# Patient Record
Sex: FEMALE | Race: WHITE | NOT HISPANIC OR LATINO | Employment: UNEMPLOYED | URBAN - METROPOLITAN AREA
[De-identification: names, ages, dates, MRNs, and addresses within clinical notes are randomized per-mention and may not be internally consistent; named-entity substitution may affect disease eponyms.]

---

## 2021-08-02 PROBLEM — Z11.59 SCREENING FOR VIRAL DISEASE: Status: ACTIVE | Noted: 2021-08-02

## 2021-08-02 PROBLEM — G43.019 INTRACTABLE MIGRAINE WITHOUT AURA AND WITHOUT STATUS MIGRAINOSUS: Status: ACTIVE | Noted: 2021-08-02

## 2021-08-02 PROBLEM — G47.00 INSOMNIA: Status: ACTIVE | Noted: 2021-08-02

## 2021-08-02 PROBLEM — E55.9 VITAMIN D DEFICIENCY: Status: ACTIVE | Noted: 2021-08-02

## 2021-08-02 PROBLEM — R53.83 FATIGUE: Status: ACTIVE | Noted: 2021-08-02

## 2023-09-20 ENCOUNTER — TELEPHONE (OUTPATIENT)
Dept: SURGERY | Facility: CLINIC | Age: 38
End: 2023-09-20
Payer: COMMERCIAL

## 2023-09-20 NOTE — TELEPHONE ENCOUNTER
Pt with h/o hemorrhoids, c/o increasing pain. Assisted pt making appt with Dr Page.  ----- Message from Tonya Wright RN sent at 9/20/2023 12:30 PM CDT -----  Contact: Pt    ----- Message -----  From: Ronel Mena  Sent: 9/20/2023  11:48 AM CDT  To: [facility]    Type:  Needs Medical Advice    Who Called: Pt  Symptoms (please be specific): Hemorrhoids   How long has patient had these symptoms:  2 weeks    Would the patient rather a call back or a response via [facility]? call  Best Call Back Number: [phone]  Additional Information: Pt would like to schedule with a colorectal surgeon. Please call pt back to advise.

## 2023-09-21 ENCOUNTER — OFFICE VISIT (OUTPATIENT)
Dept: SURGERY | Facility: CLINIC | Age: 38
End: 2023-09-21
Payer: COMMERCIAL

## 2023-09-21 VITALS
RESPIRATION RATE: 16 BRPM | BODY MASS INDEX: 19.95 KG/M2 | HEIGHT: 60 IN | TEMPERATURE: 98 F | HEART RATE: 74 BPM | SYSTOLIC BLOOD PRESSURE: 120 MMHG | OXYGEN SATURATION: 98 % | WEIGHT: 101.63 LBS | DIASTOLIC BLOOD PRESSURE: 78 MMHG

## 2023-09-21 DIAGNOSIS — K61.1 PERIRECTAL ABSCESS: Primary | ICD-10-CM

## 2023-09-21 PROCEDURE — 3074F PR MOST RECENT SYSTOLIC BLOOD PRESSURE < 130 MM HG: ICD-10-PCS | Mod: CPTII,S$GLB,, | Performed by: COLON & RECTAL SURGERY

## 2023-09-21 PROCEDURE — 99999 PR PBB SHADOW E&M-EST. PATIENT-LVL III: CPT | Mod: PBBFAC,,, | Performed by: COLON & RECTAL SURGERY

## 2023-09-21 PROCEDURE — 1159F MED LIST DOCD IN RCRD: CPT | Mod: CPTII,S$GLB,, | Performed by: COLON & RECTAL SURGERY

## 2023-09-21 PROCEDURE — 1159F PR MEDICATION LIST DOCUMENTED IN MEDICAL RECORD: ICD-10-PCS | Mod: CPTII,S$GLB,, | Performed by: COLON & RECTAL SURGERY

## 2023-09-21 PROCEDURE — 99999 PR PBB SHADOW E&M-EST. PATIENT-LVL III: ICD-10-PCS | Mod: PBBFAC,,, | Performed by: COLON & RECTAL SURGERY

## 2023-09-21 PROCEDURE — 3008F BODY MASS INDEX DOCD: CPT | Mod: CPTII,S$GLB,, | Performed by: COLON & RECTAL SURGERY

## 2023-09-21 PROCEDURE — 3074F SYST BP LT 130 MM HG: CPT | Mod: CPTII,S$GLB,, | Performed by: COLON & RECTAL SURGERY

## 2023-09-21 PROCEDURE — 3078F PR MOST RECENT DIASTOLIC BLOOD PRESSURE < 80 MM HG: ICD-10-PCS | Mod: CPTII,S$GLB,, | Performed by: COLON & RECTAL SURGERY

## 2023-09-21 PROCEDURE — 3078F DIAST BP <80 MM HG: CPT | Mod: CPTII,S$GLB,, | Performed by: COLON & RECTAL SURGERY

## 2023-09-21 PROCEDURE — 99204 PR OFFICE/OUTPT VISIT, NEW, LEVL IV, 45-59 MIN: ICD-10-PCS | Mod: S$GLB,,, | Performed by: COLON & RECTAL SURGERY

## 2023-09-21 PROCEDURE — 99204 OFFICE O/P NEW MOD 45 MIN: CPT | Mod: S$GLB,,, | Performed by: COLON & RECTAL SURGERY

## 2023-09-21 PROCEDURE — 3008F PR BODY MASS INDEX (BMI) DOCUMENTED: ICD-10-PCS | Mod: CPTII,S$GLB,, | Performed by: COLON & RECTAL SURGERY

## 2023-09-21 RX ORDER — METRONIDAZOLE 500 MG/1
500 TABLET ORAL
COMMUNITY
End: 2023-09-21 | Stop reason: SDUPTHER

## 2023-09-21 RX ORDER — METRONIDAZOLE 500 MG/1
500 TABLET ORAL EVERY 8 HOURS
Qty: 30 TABLET | Refills: 0 | Status: SHIPPED | OUTPATIENT
Start: 2023-09-21 | End: 2023-10-18 | Stop reason: ALTCHOICE

## 2023-09-21 RX ORDER — TRAZODONE HYDROCHLORIDE 50 MG/1
25 TABLET ORAL NIGHTLY
COMMUNITY
Start: 2023-09-06

## 2023-09-21 NOTE — PROGRESS NOTES
"[facility] Office/In-Patient History and Physical    Referring MD:   No referring provider defined for this encounter.    SUBJECTIVE:     History of Present Illness:  Patient is a 38 y.o. female with a Family Hx of IBD in 3 cousins (2 Crohn's & 1 Colitis) who has had a Hemorrhoidectomy 5-6 years ago for protruding hemorrhoids. She then underwent office excision of large skin tags in 2023 that apparently required a "V" flap repair with postoperative relative stenosis that seemed to resolve itself. She presents now with 2 weeks of painful swelling near the coccyx without fever or chills. This site burst this morning and drained some pus/blood with partial relief of pain. She is able to sit fairly comfortably now. She presents for further evaluation.    Last Colonoscopy: N/A      Past Medical History:   Diagnosis Date    Anxiety     Common migraine without aura     Moderate recurrent major depression     Pleurodynia        Past Surgical History:   Procedure Laterality Date    AUGMENTATION OF BREAST  2002     SECTION  2003       Review of patient's allergies indicates:  No Known Allergies    Current Outpatient Medications on File Prior to Visit   Medication Sig Dispense Refill    busPIRone (BUSPAR) 10 MG tablet SMARTSI By Mouth Twice Daily PRN      levocetirizine (XYZAL) 5 MG tablet       rimegepant (NURTEC) 75 mg odt Take 75 mg by mouth once as needed for Migraine. Place ODT tablet on the tongue; alternatively the ODT tablet may be placed under the tongue      traZODone (DESYREL) 50 MG tablet Take 50 mg by mouth every evening.       No current facility-administered medications on file prior to visit.       Family History   Problem Relation Age of Onset    Hypertension Mother     Hyperlipidemia Mother     Hypertension Father     Coronary artery disease Father     Hyperlipidemia Father        Social History     Tobacco Use    Smoking status: Never    Smokeless tobacco: Never   Substance Use Topics    " Alcohol use: Yes    Drug use: Not Currently        Review of Systems:  Constitutional: Negative for fever, appetite change and unexpected weight change.  HENT: Negative for sore throat and trouble swallowing.  Eyes: Negative for visual disturbance.  Respiratory: Negative for chest tightness, shortness of breath and wheezing.  Cardiovascular: Negative for chest pain.  Gastrointestinal:  as per HPI  Genitourinary: Negative for dysuria, frequency and hematuria.  Musculoskeletal: Negative for myalgias, joint swelling and arthralgias.  Skin: Negative for color change and rash.   Neurological: Negative for syncope, weakness and headaches.  Psychiatric/Behavioral: Negative for confusion.      OBJECTIVE:     Vital Signs (Most Recent)  /78 (BP Location: Right arm, Patient Position: Sitting, BP Method: Medium (Manual))   Pulse 74   Temp 98 °F (36.7 °C) (Temporal)   Resp 16   Ht 5' (1.524 m)   Wt 46.1 kg (101 lb 10.1 oz)   SpO2 98%   BMI 19.85 kg/m²     Physical Exam:  GENERAL:  Comfortable, in no acute distress.      SKIN: Non-jaundiced  EXTREMITIES:  No edema.     NEURO: CN II-XII intact; motor/sensory non-focal.                            HEENT EXAM:  Nonicteric.  No adenopathy.  Oropharynx is clear.               NECK:  Supple.                                                               LUNGS:  Clear.                                                               CARDIAC:  Regular rate and rhythm.  S1, S2.  No murmur.                      ABDOMEN:  Soft, positive bowel sounds, nontender.  No hepatosplenomegaly or masses.  No rebound or guarding.                                                Anorectal:   Findings:   Inspection: draining external sinus- LP, thick pus noted from small pinpoint opening, ? Fissure- MLP just inside anus (or post-op changes from anorectal operations?)  ALISON:  painful      ASSESSMENT:     Encounter Diagnosis   Name Primary?    Perirectal abscess Yes          PLAN:    Reassured that  this abscess has spontaneously drained, therefore no need for surgical intervention at this point. Flagyl Rx prescribed and allow further resolution of the infection. There is potential for an underlying anal fistula tract that will become apparent over time, if the sinus opening does not heal completely. Plan colonoscopy at some point in order to R/O underlying IBD or other pathology.  RTO in 2 weeks for follow-up.

## 2023-10-09 ENCOUNTER — OFFICE VISIT (OUTPATIENT)
Dept: SURGERY | Facility: CLINIC | Age: 38
End: 2023-10-09
Payer: COMMERCIAL

## 2023-10-09 VITALS
RESPIRATION RATE: 16 BRPM | HEART RATE: 73 BPM | BODY MASS INDEX: 20.04 KG/M2 | DIASTOLIC BLOOD PRESSURE: 62 MMHG | OXYGEN SATURATION: 99 % | TEMPERATURE: 97 F | WEIGHT: 102.06 LBS | HEIGHT: 60 IN | SYSTOLIC BLOOD PRESSURE: 108 MMHG

## 2023-10-09 DIAGNOSIS — K61.1 PERIRECTAL ABSCESS: Primary | ICD-10-CM

## 2023-10-09 PROCEDURE — 99213 OFFICE O/P EST LOW 20 MIN: CPT | Mod: S$GLB,,, | Performed by: COLON & RECTAL SURGERY

## 2023-10-09 PROCEDURE — 3078F DIAST BP <80 MM HG: CPT | Mod: CPTII,S$GLB,, | Performed by: COLON & RECTAL SURGERY

## 2023-10-09 PROCEDURE — 1159F MED LIST DOCD IN RCRD: CPT | Mod: CPTII,S$GLB,, | Performed by: COLON & RECTAL SURGERY

## 2023-10-09 PROCEDURE — 3008F PR BODY MASS INDEX (BMI) DOCUMENTED: ICD-10-PCS | Mod: CPTII,S$GLB,, | Performed by: COLON & RECTAL SURGERY

## 2023-10-09 PROCEDURE — 99213 PR OFFICE/OUTPT VISIT, EST, LEVL III, 20-29 MIN: ICD-10-PCS | Mod: S$GLB,,, | Performed by: COLON & RECTAL SURGERY

## 2023-10-09 PROCEDURE — 3078F PR MOST RECENT DIASTOLIC BLOOD PRESSURE < 80 MM HG: ICD-10-PCS | Mod: CPTII,S$GLB,, | Performed by: COLON & RECTAL SURGERY

## 2023-10-09 PROCEDURE — 3074F PR MOST RECENT SYSTOLIC BLOOD PRESSURE < 130 MM HG: ICD-10-PCS | Mod: CPTII,S$GLB,, | Performed by: COLON & RECTAL SURGERY

## 2023-10-09 PROCEDURE — 99999 PR PBB SHADOW E&M-EST. PATIENT-LVL III: ICD-10-PCS | Mod: PBBFAC,,, | Performed by: COLON & RECTAL SURGERY

## 2023-10-09 PROCEDURE — 3074F SYST BP LT 130 MM HG: CPT | Mod: CPTII,S$GLB,, | Performed by: COLON & RECTAL SURGERY

## 2023-10-09 PROCEDURE — 99999 PR PBB SHADOW E&M-EST. PATIENT-LVL III: CPT | Mod: PBBFAC,,, | Performed by: COLON & RECTAL SURGERY

## 2023-10-09 PROCEDURE — 3008F BODY MASS INDEX DOCD: CPT | Mod: CPTII,S$GLB,, | Performed by: COLON & RECTAL SURGERY

## 2023-10-09 PROCEDURE — 1159F PR MEDICATION LIST DOCUMENTED IN MEDICAL RECORD: ICD-10-PCS | Mod: CPTII,S$GLB,, | Performed by: COLON & RECTAL SURGERY

## 2023-10-09 NOTE — PROGRESS NOTES
"CRS Office/In-Patient History and Physical    Referring MD:   No referring provider defined for this encounter.    SUBJECTIVE:     History of Present Illness:  Patient is a 38 y.o. female with a Family Hx of IBD in 3 cousins (2 Crohn's & 1 Colitis) who had a Hemorrhoidectomy 5-6 years ago for protruding hemorrhoids. She then underwent office excision of large skin tags in 2023 that apparently required a "V" flap repair with postoperative relative stenosis that seemed to resolve itself. She presents now with 2 weeks of painful swelling near the coccyx without fever or chills, which burst earlier in the morning and drained some pus/blood with partial relief of pain. She was given a course of Flagyl and feels better without fever or chills and the pain has improved. She is able to sit comfortably now. She presents for further evaluation.     Last Colonoscopy: N/A    I have reviewed the following images and labs:  No results found for this or any previous visit.     Lab Results   Component Value Date    WBC 3.48 (L) 2021    HGB 13.6 2021    HCT 40.6 2021     2021    CHOL 200 (H) 2021    TRIG 58 2021    HDL 66 2021    ALT 15 2021    AST 25 2021     2021    K 4.5 2021     2021    CREATININE 0.80 2021    BUN 18 2021    CO2 29 2021    TSH 1.580 2021        Past Medical History:   Diagnosis Date    Anxiety     Common migraine without aura     Moderate recurrent major depression     Pleurodynia        Past Surgical History:   Procedure Laterality Date    AUGMENTATION OF BREAST  2002     SECTION  2003       Review of patient's allergies indicates:   Allergen Reactions    Hydrocodone-acetaminophen Itching       Current Outpatient Medications on File Prior to Visit   Medication Sig Dispense Refill    busPIRone (BUSPAR) 10 MG tablet SMARTSI By Mouth Twice Daily PRN      levocetirizine (XYZAL) 5 " MG tablet       metroNIDAZOLE (FLAGYL) 500 MG tablet Take 1 tablet (500 mg total) by mouth every 8 (eight) hours. Take 1 pill three times a day for 10 days. 30 tablet 0    rimegepant (NURTEC) 75 mg odt Take 75 mg by mouth once as needed for Migraine. Place ODT tablet on the tongue; alternatively the ODT tablet may be placed under the tongue      traZODone (DESYREL) 50 MG tablet Take 50 mg by mouth every evening.       No current facility-administered medications on file prior to visit.       Family History   Problem Relation Age of Onset    Hypertension Mother     Hyperlipidemia Mother     Hypertension Father     Coronary artery disease Father     Hyperlipidemia Father        Social History     Tobacco Use    Smoking status: Never    Smokeless tobacco: Never   Substance Use Topics    Alcohol use: Yes    Drug use: Not Currently        Review of Systems:  Constitutional: Negative for fever, appetite change and unexpected weight change.  HENT: Negative for sore throat and trouble swallowing.  Eyes: Negative for visual disturbance.  Respiratory: Negative for chest tightness, shortness of breath and wheezing.  Cardiovascular: Negative for chest pain.  Gastrointestinal:  as per HPI  Genitourinary: Negative for dysuria, frequency and hematuria.  Musculoskeletal: Negative for myalgias, joint swelling and arthralgias.  Skin: Negative for color change and rash.   Neurological: Negative for syncope, weakness and headaches.  Psychiatric/Behavioral: Negative for confusion.      OBJECTIVE:     Vital Signs (Most Recent)  /62 (BP Location: Left arm, Patient Position: Sitting, BP Method: Medium (Manual))   Pulse 73   Temp 97.3 °F (36.3 °C) (Temporal)   Resp 16   Ht 5' (1.524 m)   Wt 46.3 kg (102 lb 1.2 oz)   LMP 09/08/2023 (Within Days)   SpO2 99%   BMI 19.93 kg/m²            Physical Exam:  GENERAL:  Comfortable, in no acute distress.      SKIN: Non-jaundiced  EXTREMITIES:  No edema.     NEURO: CN II-XII intact;  motor/sensory non-focal.                            HEENT EXAM:  Nonicteric.  No adenopathy.  Oropharynx is clear.               NECK:  Supple.                                                               LUNGS:  Clear.                                                               CARDIAC:  Regular rate and rhythm.  S1, S2.  No murmur.                      ABDOMEN:  Soft, positive bowel sounds, nontender.  No hepatosplenomegaly or masses.  No rebound or guarding.                                                Anorectal:   Inspection: + old draining sinus - LP, looks closed today without fluctuance or induration  ALISON:  deferred      ASSESSMENT:     Encounter Diagnosis   Name Primary?    Perirectal abscess Yes          PLAN:    Recommend colonoscopy prior to I & D with possible fistulotomy for the chronic anal fistula. We will make the appropriate arrangements when it is convenient for her.

## 2023-11-13 ENCOUNTER — OFFICE VISIT (OUTPATIENT)
Dept: SURGERY | Facility: CLINIC | Age: 38
End: 2023-11-13
Payer: COMMERCIAL

## 2023-11-13 VITALS
SYSTOLIC BLOOD PRESSURE: 105 MMHG | DIASTOLIC BLOOD PRESSURE: 61 MMHG | WEIGHT: 103.19 LBS | OXYGEN SATURATION: 97 % | TEMPERATURE: 97 F | HEART RATE: 96 BPM | BODY MASS INDEX: 20.26 KG/M2 | RESPIRATION RATE: 16 BRPM | HEIGHT: 60 IN

## 2023-11-13 DIAGNOSIS — K60.3 TRANSSPHINCTERIC ANAL FISTULA: Primary | ICD-10-CM

## 2023-11-13 PROCEDURE — 3074F PR MOST RECENT SYSTOLIC BLOOD PRESSURE < 130 MM HG: ICD-10-PCS | Mod: CPTII,S$GLB,, | Performed by: COLON & RECTAL SURGERY

## 2023-11-13 PROCEDURE — 1159F PR MEDICATION LIST DOCUMENTED IN MEDICAL RECORD: ICD-10-PCS | Mod: CPTII,S$GLB,, | Performed by: COLON & RECTAL SURGERY

## 2023-11-13 PROCEDURE — 3078F PR MOST RECENT DIASTOLIC BLOOD PRESSURE < 80 MM HG: ICD-10-PCS | Mod: CPTII,S$GLB,, | Performed by: COLON & RECTAL SURGERY

## 2023-11-13 PROCEDURE — 99024 PR POST-OP FOLLOW-UP VISIT: ICD-10-PCS | Mod: S$GLB,,, | Performed by: COLON & RECTAL SURGERY

## 2023-11-13 PROCEDURE — 3074F SYST BP LT 130 MM HG: CPT | Mod: CPTII,S$GLB,, | Performed by: COLON & RECTAL SURGERY

## 2023-11-13 PROCEDURE — 1159F MED LIST DOCD IN RCRD: CPT | Mod: CPTII,S$GLB,, | Performed by: COLON & RECTAL SURGERY

## 2023-11-13 PROCEDURE — 3078F DIAST BP <80 MM HG: CPT | Mod: CPTII,S$GLB,, | Performed by: COLON & RECTAL SURGERY

## 2023-11-13 PROCEDURE — 99024 POSTOP FOLLOW-UP VISIT: CPT | Mod: S$GLB,,, | Performed by: COLON & RECTAL SURGERY

## 2023-11-13 PROCEDURE — 99999 PR PBB SHADOW E&M-EST. PATIENT-LVL IV: ICD-10-PCS | Mod: PBBFAC,,, | Performed by: COLON & RECTAL SURGERY

## 2023-11-13 PROCEDURE — 99999 PR PBB SHADOW E&M-EST. PATIENT-LVL IV: CPT | Mod: PBBFAC,,, | Performed by: COLON & RECTAL SURGERY

## 2023-11-13 NOTE — PROGRESS NOTES
HPI:  Randi Cuevas is a 38 y.o. female  3  weeks  s/p fistulectomy with complete resolution of pain and drainage. She is having regular stools without difficulty. The final pathology report showed benign tissue only. She presents for follow-up.       Past Medical History:   Diagnosis Date    Anxiety     Asthma     Common migraine without aura     Migraines     Moderate recurrent major depression     Pleurodynia         Past Surgical History:   Procedure Laterality Date    AUGMENTATION OF BREAST  2002     SECTION  2003    COLONOSCOPY N/A 10/19/2023    Procedure: COLONOSCOPY;  Surgeon: Demetrio Page MD;  Location: [facility];  Service: Colon and Rectal;  Laterality: N/A;  pt will go home    FISTULECTOMY N/A 10/20/2023    Procedure: FISTULECTOMY;  Surgeon: Demetrio Page MD;  Location: [facility];  Service: Colon and Rectal;  Laterality: N/A;    HEMORRHOID SURGERY      twice)( second sx in 2022, first sx 2017    INCISION AND DRAINAGE OF ABSCESS N/A 10/20/2023    Procedure: INCISION AND DRAINAGE, ABSCESS- Rectal;  Surgeon: Demetrio Page MD;  Location: [facility];  Service: Colon and Rectal;  Laterality: N/A;    LASIK Bilateral     TONSILLECTOMY         Review of patient's allergies indicates:   Allergen Reactions    Hydrocodone-acetaminophen Itching       Family History   Problem Relation Age of Onset    Hypertension Mother     Hyperlipidemia Mother     Hypertension Father     Coronary artery disease Father     Hyperlipidemia Father        Social History     Socioeconomic History    Marital status:    Tobacco Use    Smoking status: Never    Smokeless tobacco: Never   Substance and Sexual Activity    Alcohol use: Yes     Comment: rarely    Drug use: Yes     Types: Marijuana     Comment: edible - rarely    Sexual activity: Yes     Partners: Male   Social History Narrative    ** Merged History Encounter **            ROS:  GENERAL: No fever, chills, fatigability or weight  loss.  Integument: No rashes, redness, icterus  CHEST: Denies JEFFERSON, cyanosis, wheezing, cough and sputum production.  CARDIOVASCULAR: Denies chest pain, PND, orthopnea or reduced exercise tolerance.  GI: Denies abd pain, dysphagia, nausea, vomiting, no hematemesis   : Denies burning on urination, no hematuria, no bacteriuria  MSK: No deformities, swelling, joint pain swelling  Neurologic: No HAs, seizures, weakness, paresthesias, gait problems    PE:  General appearance WDWN WF in NAD.  /61 (BP Location: Left arm, Patient Position: Sitting, BP Method: Medium (Automatic))   Pulse 96   Temp 97.1 °F (36.2 °C) (Temporal)   Resp 16   Ht 5' (1.524 m)   Wt 46.8 kg (103 lb 2.8 oz)   LMP 10/13/2023 (Within Days)   SpO2 97%   BMI 20.15 kg/m²   Sclera/ Skin non-icteric  LN not palpable  AT NC EOMI  EXT - no CCE  Neuro:  Mood/ affect nl, alert and oriented x 3, moves all ext's, gait nl  Neck supple trachea midline   Chest symmetric, nl excursion, no retractions, breathing comfortably  Abdomen: soft, NDNT     Anorectal:  Inspection- almost completely healed, 2-3 mm residual wound just to the left of MLA off anal verge  ALISON-  deferred      Assessment:  S/P Fistulectomy    Plan:  She was quite pleased with the results of surgery, as are we. RTO in 4-6 weeks for perhaps her last post-op visit. Screening colonoscopy in 10 years.

## 2024-02-05 ENCOUNTER — OFFICE VISIT (OUTPATIENT)
Dept: OPTOMETRY | Facility: CLINIC | Age: 39
End: 2024-02-05
Payer: COMMERCIAL

## 2024-02-05 DIAGNOSIS — H00.12 CHALAZION OF RIGHT LOWER EYELID: Primary | ICD-10-CM

## 2024-02-05 DIAGNOSIS — H00.022 HORDEOLUM INTERNUM OF RIGHT LOWER EYELID: ICD-10-CM

## 2024-02-05 PROCEDURE — 99999 PR PBB SHADOW E&M-EST. PATIENT-LVL III: CPT | Mod: PBBFAC,,, | Performed by: OPTOMETRIST

## 2024-02-05 PROCEDURE — 99203 OFFICE O/P NEW LOW 30 MIN: CPT | Mod: S$GLB,,, | Performed by: OPTOMETRIST

## 2024-02-05 PROCEDURE — 1159F MED LIST DOCD IN RCRD: CPT | Mod: CPTII,S$GLB,, | Performed by: OPTOMETRIST

## 2024-02-05 RX ORDER — DOXYCYCLINE 100 MG/1
100 CAPSULE ORAL EVERY 12 HOURS
Qty: 14 CAPSULE | Refills: 0 | Status: SHIPPED | OUTPATIENT
Start: 2024-02-05 | End: 2024-02-12

## 2024-02-05 RX ORDER — NEOMYCIN SULFATE, POLYMYXIN B SULFATE, AND DEXAMETHASONE 3.5; 10000; 1 MG/G; [USP'U]/G; MG/G
OINTMENT OPHTHALMIC
Qty: 3.5 G | Refills: 0 | Status: SHIPPED | OUTPATIENT
Start: 2024-02-05 | End: 2024-02-12

## 2024-02-05 NOTE — PROGRESS NOTES
HPI    Pt here for office visit. AVERY 01/2015      Pt complains of stye in OD inner lid. Pt noticed 2weeks ago stye located   in same eyelid but applied warm compress and stye went away. Pt woke up   Saturday to another stye (same eye) inside OD lid. Pt complains of   redness, itchiness, discomfort. Pt denies flashes and floaters. Pt took   ointment drop for itchiness but denies using any eye drops  Last edited by Linda Arciniega on 2/5/2024  2:37 PM.            Assessment /Plan     For exam results, see Encounter Report.    Chalazion of right lower eyelid  -     doxycycline (MONODOX) 100 MG capsule; Take 1 capsule (100 mg total) by mouth every 12 (twelve) hours. for 7 days  Dispense: 14 capsule; Refill: 0  -     neomycin-polymyxin-dexamethasone (MAXITROL) 3.5 mg/g-10,000 unit/g-0.1 % Oint; Instill into OD bid x 7 days  Dispense: 3.5 g; Refill: 0    Hordeolum internum of right lower eyelid  -     doxycycline (MONODOX) 100 MG capsule; Take 1 capsule (100 mg total) by mouth every 12 (twelve) hours. for 7 days  Dispense: 14 capsule; Refill: 0  -     neomycin-polymyxin-dexamethasone (MAXITROL) 3.5 mg/g-10,000 unit/g-0.1 % Oint; Instill into OD bid x 7 days  Dispense: 3.5 g; Refill: 0      1,2.   Chalazion RLL   Mildly tender, resolving hordeolum     Hot compress/ gentle massage   Oral doxy/ topical maxitrol as noted   Increase probiotics x 7 days     Gave info, discussed possible excision procedure     Message with report in 5-7 days, prn

## 2024-08-19 ENCOUNTER — TELEPHONE (OUTPATIENT)
Dept: SURGERY | Facility: CLINIC | Age: 39
End: 2024-08-19
Payer: COMMERCIAL

## 2024-08-19 NOTE — TELEPHONE ENCOUNTER
LVM -- Pt scheduled to see Dr Lay 8/28 at 9:20am      ----- Message from Yajaira Dasilva MA sent at 8/19/2024  7:54 AM CDT -----  I wouldn't see why she would need a new referral. I would just put her in with Alire[facility].  ----- Message -----  From: Azeb Vasquez RN  Sent: 8/14/2024   4:43 PM CDT  To: Yajaira Dasilva MA; Azeb Vasquez RN    This patient saw Dr Page -- Can I just schedule her with Dr Lay or does she need a new referral?    Thank You,  Azeb  ----- Message -----  From: Trice Simon RN  Sent: 8/14/2024   3:28 PM CDT  To: Azeb Vasquez RN      ----- Message -----  From: Lakeisha Rahman  Sent: 8/14/2024   3:25 PM CDT  To: [facility] Staff    Type: Needs Medical Advice  Who Called:  Patient   Symptoms (please be specific):    How long has patient had these symptoms:    Pharmacy name and phone #:    Best Call Back Number: [phone]  Additional Information: Patient is requesting a call back to schedule an appt..

## 2025-05-14 ENCOUNTER — OFFICE VISIT (OUTPATIENT)
Dept: SURGERY | Facility: CLINIC | Age: 40
End: 2025-05-14
Payer: COMMERCIAL

## 2025-05-14 VITALS
WEIGHT: 112.19 LBS | HEIGHT: 60 IN | HEART RATE: 82 BPM | RESPIRATION RATE: 17 BRPM | TEMPERATURE: 98 F | DIASTOLIC BLOOD PRESSURE: 80 MMHG | OXYGEN SATURATION: 97 % | SYSTOLIC BLOOD PRESSURE: 120 MMHG | BODY MASS INDEX: 22.03 KG/M2

## 2025-05-14 DIAGNOSIS — K64.4 EXTERNAL HEMORRHOID, BLEEDING: Primary | ICD-10-CM

## 2025-05-14 PROCEDURE — 99203 OFFICE O/P NEW LOW 30 MIN: CPT | Mod: S$GLB,,,

## 2025-05-14 PROCEDURE — 3008F BODY MASS INDEX DOCD: CPT | Mod: CPTII,S$GLB,,

## 2025-05-14 PROCEDURE — 3074F SYST BP LT 130 MM HG: CPT | Mod: CPTII,S$GLB,,

## 2025-05-14 PROCEDURE — 1159F MED LIST DOCD IN RCRD: CPT | Mod: CPTII,S$GLB,,

## 2025-05-14 PROCEDURE — 99999 PR PBB SHADOW E&M-EST. PATIENT-LVL IV: CPT | Mod: PBBFAC,,,

## 2025-05-14 PROCEDURE — 3079F DIAST BP 80-89 MM HG: CPT | Mod: CPTII,S$GLB,,
